# Patient Record
(demographics unavailable — no encounter records)

---

## 2025-07-28 NOTE — ASSESSMENT
[FreeTextEntry1] : Patient is a 59 y/o M who was recently admitted at [facility] (7/11 to 7/17/2025) for acute CVA. He presented to the clinic for transition of care and to establish care.  #CVA > MRI Head No Contrast 7.14.25 IMPRESSION: Small acute/subacute infarct in the left posterior corona radiata/basal ganglia. No acute intracranial hemorrhage. > Believed to be in setting of HTN. > Symptoms resolved during hospitalization.  #HLD > Lipid [id number]: Chol 307, , HDL 43, NonHDL 264, . > ASCVD (Atherosclerotic Cardiovascular Disease) Risk Algorithm including Known ASCVD from AHA/ACC from [employer]  on 7/28/2025:  High-intensity statin recommended; if not tolerated or not a candidate, moderate-intensity recommended because of known ASCVD and age 75. - START atorvastatin 80mg qHS  #Hashimoto Thyroiditis - TSH markedly elevated 224 repeat 201 , free T4 <0.1, TPO 94.9 - recheck TSH w/ FT4 - Continue Levothyroxine 100mg qd - f/u Endo  #HTN > Pt experiencing dizziness ~1h following admin of amlodipine. > BP elevated today, however pt endorses healthcare associated HTN. Noted to be normotensive inpatient at night. - BP Monitor at home w/ log - Discontinue amlodipine - Start Losartan 25mg qd  #Transaminitis Noted inpatient > LFTs on discharge 07.17.25: Protein Total: 7.5 g/dL, Albumin: 4.4 g/dL, Bilirubin Total: 0.6 mg/dL, Alkaline Phosphatase: 119 U/L, Aspartate Aminotransferase (AST/SGOT): 113 U/L, Alanine Aminotransferase (ALT/SGPT): 135 U/L DA. - Repeat LFTs  #PreDiabetes > A1C 6.3 on 7.11.25  #C/F kidney disease Cr on discharge 2.02 (7/17/25). - Repeat CMP, UA, Lytes, Prot/Cr today.  #Generalized Anxiety Disorder - NICKY-7 - 4 (minimal) Patient endorses feelings of anxiety and nervousness over past few months (prior to DC from hospital). He does feel worn out at times related to these feelings, in addition to restlessness and muscle tension. He does endorse sleep disturbance as well as difficult concentrating at times. These feelings have improved in recent weeks relative to prior hospitalization.  - Consider further evaluation at next encounter.  #ED #PSA Screening Pt endorses Nocturnal penile tumescence, however decrease in libido during sexual activity. I believe this is related to NICKY, and patient concurs. - Testosterone serology - Prostate serology - Further evaluation at next appointment.  Total time spent caring for the patient today was 60 minutes. This includes time spent before the visit reviewing the chart, time spent during visit, and time spent after the visit on documentation, etc.

## 2025-07-28 NOTE — INTERPRETER SERVICES
[Language Line ] : provided by [employer]   [Time Spent: ____ minutes] : Total time spent using  services: [unfilled] minutes. The patient's primary language is not English thus required  services. [TWNoteComboBox1] : Marshallese

## 2025-07-28 NOTE — ASSESSMENT
[FreeTextEntry1] : Patient is a 59 y/o M who was recently admitted at [facility] (7/11 to 7/17/2025) for acute CVA. He presented to the clinic for transition of care and to establish care.  #CVA > MRI Head No Contrast 7.14.25 IMPRESSION: Small acute/subacute infarct in the left posterior corona radiata/basal ganglia. No acute intracranial hemorrhage. > Believed to be in setting of HTN. > Symptoms resolved during hospitalization.  #HLD > Lipid [id number]: Chol 307, , HDL 43, NonHDL 264, . > ASCVD (Atherosclerotic Cardiovascular Disease) Risk Algorithm including Known ASCVD from AHA/ACC from [employer]  on 7/28/2025:  High-intensity statin recommended; if not tolerated or not a candidate, moderate-intensity recommended because of known ASCVD and age 75. - START atorvastatin 80mg qHS  #Hashimoto Thyroiditis - TSH markedly elevated 224 repeat 201 , free T4 <0.1, TPO 94.9 - recheck TSH w/ FT4 - Continue Levothyroxine 100mg qd - f/u Endo  #HTN > Pt experiencing dizziness ~1h following admin of amlodipine. > BP elevated today, however pt endorses healthcare associated HTN. Noted to be normotensive inpatient at night. - BP Monitor at home w/ log - Discontinue amlodipine - Start Losartan 25mg qd  #Transaminitis Noted inpatient > LFTs on discharge 07.17.25: Protein Total: 7.5 g/dL, Albumin: 4.4 g/dL, Bilirubin Total: 0.6 mg/dL, Alkaline Phosphatase: 119 U/L, Aspartate Aminotransferase (AST/SGOT): 113 U/L, Alanine Aminotransferase (ALT/SGPT): 135 U/L DA. - Repeat LFTs  #PreDiabetes > A1C 6.3 on 7.11.25  #C/F kidney disease Cr on discharge 2.02 (7/17/25). - Repeat CMP, UA, Lytes, Prot/Cr today.  #Generalized Anxiety Disorder - NICKY-7 - 4 (minimal) Patient endorses feelings of anxiety and nervousness over past few months (prior to DC from hospital). He does feel worn out at times related to these feelings, in addition to restlessness and muscle tension. He does endorse sleep disturbance as well as difficult concentrating at times. These feelings have improved in recent weeks relative to prior hospitalization.  - Consider further evaluation at next encounter.  #ED #PSA Screening Pt endorses Nocturnal penile tumescence, however decrease in libido during sexual activity. I believe this is related to NICKY, and patient concurs. - Testosterone serology - Prostate serology - Further evaluation at next appointment.  Total time spent caring for the patient today was 60 minutes. This includes time spent before the visit reviewing the chart, time spent during visit, and time spent after the visit on documentation, etc.

## 2025-07-28 NOTE — HISTORY OF PRESENT ILLNESS
[Post-hospitalization from ___ Hospital] : Post-hospitalization from [unfilled] Hospital [Admitted on: ___] : The patient was admitted on [unfilled] [Discharged on ___] : discharged on [unfilled] [Discharge Summary] : discharge summary [Pertinent Labs] : pertinent labs [Radiology Findings] : radiology findings [Discharge Med List] : discharge medication list [Med Reconciliation] : medication reconciliation has been completed [FreeTextEntry2] : Pt name in [facility] chart: Afua Christian; MRN [id number].  58M from home, ambulates independently at baseline, with no known PMH (has not seen a physician in years), who presented to the ED for right arm and leg weakness and numbness that started at 4:30AM today causing him to fall when getting out of bed, also with slurred speech over the past few days. 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   Admitted for CVA work up.  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 No acute intracranial hemorrhage. Secondary stroke prevention: ASA 81mg Plavix x 3 weeks. Until 8/1/2025.  TTE with LVEF 67%, negative for intracardiac shunt. - TTE  : Preserved EF, no PFO, will need internal loop recorder inpt Vs outpt. -Evaluated by Endo Found to have TSH markedly elevated 224 repeat 201 , free T4 <0.1, TPO 94.9. HX of  Hypothyroidism and Hashimoto's thyroiditis on synthroid 100mcg qd. currently not in Myxedema. A1c 6.3 Needs to repeat TFT in 4 weeks outpt, with PCP -Pt labs indicated Transaminitis >>144>149 and ALT 94>>120>139. and JAYDON  SCr 2.02>>1.99>2.14. Holding statin for now will need PCP f/u outpt.

## 2025-07-28 NOTE — HEALTH RISK ASSESSMENT
[Yes] : Yes [2 - 4 times a month (2 pts)] : 2-4 times a month (2 points) [1 or 2 (0 pts)] : 1 or 2 (0 points) [Never (0 pts)] : Never (0 points) [No] : In the past 12 months have you used drugs other than those required for medical reasons? No [No falls in past year] : Patient reported no falls in the past year [Little interest or pleasure doing things] : 1) Little interest or pleasure doing things [Feeling down, depressed, or hopeless] : 2) Feeling down, depressed, or hopeless [0] : 2) Feeling down, depressed, or hopeless: Not at all (0) [With Significant Other] : lives with significant other [Employed] : employed [Sexually Active] : sexually active [Fully functional (bathing, dressing, toileting, transferring, walking, feeding)] : Fully functional (bathing, dressing, toileting, transferring, walking, feeding) [Fully functional (using the telephone, shopping, preparing meals, housekeeping, doing laundry, using] : Fully functional and needs no help or supervision to perform IADLs (using the telephone, shopping, preparing meals, housekeeping, doing laundry, using transportation, managing medications and managing finances) [Never] : Never [de-identified] : Less than a drink a day. [Audit-CScore] : 2 [de-identified] : Danial [[id number]] : 0 [Reports changes in hearing] : Reports no changes in hearing [Reports changes in vision] : Reports no changes in vision [FreeTextEntry2] :

## 2025-07-28 NOTE — END OF VISIT
[] : Resident [FreeTextEntry3] : I, Dr. Dov Hendrickson, saw and evaluated this patient in the presence of the resident. I discussed the management with the resident. I reviewed the note and agreed with the documented plan of care with the following confirmation/ corrections/ additions: None.  [Time Spent: ___ minutes] : I have spent [unfilled] minutes of time on the encounter which excludes teaching and separately reported services.

## 2025-07-28 NOTE — HISTORY OF PRESENT ILLNESS
[Post-hospitalization from ___ Hospital] : Post-hospitalization from [unfilled] Hospital [Admitted on: ___] : The patient was admitted on [unfilled] [Discharged on ___] : discharged on [unfilled] [Discharge Summary] : discharge summary [Pertinent Labs] : pertinent labs [Radiology Findings] : radiology findings [Discharge Med List] : discharge medication list [Med Reconciliation] : medication reconciliation has been completed [FreeTextEntry2] : Pt name in [facility] chart: Afua Christian; MRN [id number].  58M from home, ambulates independently at baseline, with no known PMH (has not seen a physician in years), who presented to the ED for right arm and leg weakness and numbness that started at 4:30AM today causing him to fall when getting out of bed, also with slurred speech over the past few days. 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   Admitted for CVA work up.  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 No acute intracranial hemorrhage. Secondary stroke prevention: ASA 81mg Plavix x 3 weeks. Until 8/1/2025.  TTE with LVEF 67%, negative for intracardiac shunt. - TTE  : Preserved EF, no PFO, will need internal loop recorder inpt Vs outpt. -Evaluated by Endo Found to have TSH markedly elevated 224 repeat 201 , free T4 <0.1, TPO 94.9. HX of  Hypothyroidism and Hashimoto's thyroiditis on synthroid 100mcg qd. currently not in Myxedema. A1c 6.3 Needs to repeat TFT in 4 weeks outpt, with PCP -Pt labs indicated Transaminitis >>144>149 and ALT 94>>120>139. and JAYDON  SCr 2.02>>1.99>2.14. Holding statin for now will need PCP f/u outpt.

## 2025-07-28 NOTE — PHYSICAL EXAM
[Normal Sclera/Conjunctiva] : normal sclera/conjunctiva [PERRL] : pupils equal round and reactive to light [EOMI] : extraocular movements intact [Normal Outer Ear/Nose] : the outer ears and nose were normal in appearance [Normal Oropharynx] : the oropharynx was normal [No Respiratory Distress] : no respiratory distress  [No Accessory Muscle Use] : no accessory muscle use [Clear to Auscultation] : lungs were clear to auscultation bilaterally [Regular Rhythm] : with a regular rhythm [Normal S1, S2] : normal S1 and S2 [No Edema] : there was no peripheral edema [No Spinal Tenderness] : no spinal tenderness [No Joint Swelling] : no joint swelling [No Focal Deficits] : no focal deficits [Normal Gait] : normal gait [Cranial Nerves Optic (II)] : visual acuity and visual fields were intact [Cranial Nerves Oculomotor (III)] : the extraocular motions were intact [Cranial Nerves Trigeminal (V)] : sensation to the face and masseter strength were intact [Cranial Nerves Facial (VII)] : facial strength was intact bilaterally [Cranial Nerves Vestibulocochlear (VIII)] : hearing was intact [Cranial Nerves Accessory (XI - Cranial And Spinal)] : shoulder shrug was intact bilaterally [Cranial Nerves Hypoglossal (XII)] : there was no tongue deviation with protrusion [Sensation Tactile Decrease] : light touch was intact [Speech Grossly Normal] : speech grossly normal [Normal Affect] : the affect was normal [Alert and Oriented x3] : oriented to person, place, and time [Normal Mood] : the mood was normal [Normal Insight/Judgement] : insight and judgment were intact [No Acute Distress] : no acute distress [Well-Appearing] : well-appearing [Well Developed] : well developed [Well Nourished] : well nourished [No JVD] : no jugular venous distention [No Lymphadenopathy] : no lymphadenopathy [Supple] : supple [Thyroid Normal, No Nodules] : the thyroid was normal and there were no nodules present [Normal Appearance] : was normal in appearance [Neck Supple] : was supple [Enlarged Diffusely] : was not enlarged [Rate ___] : at [unfilled] breaths per minute [Normal Rhythm/Effort] : normal respiratory rhythm and effort [Clear Bilaterally] : the lungs were clear to auscultation bilaterally [Normal to Percussion] : the lungs were normal to percussion [No Carotid Bruits] : no carotid bruits [No Abdominal Bruit] : a ~M bruit was not heard ~T in the abdomen [No Varicosities] : no varicosities [Pedal Pulses Present] : the pedal pulses are present [No Palpable Aorta] : no palpable aorta [No Extremity Clubbing/Cyanosis] : no extremity clubbing/cyanosis [Normal Rate] : normal [Heart Rate ___] : [unfilled] bpm [Normal S1] : normal S1 [Normal S2] : normal S2 [S3] : no S3 [S4] : no S4 [No Murmur] : no murmurs heard [No Pitting Edema] : no pitting edema present [Right Carotid Bruit] : no bruit heard over the right carotid [Left Carotid Bruit] : no bruit heard over the left carotid [Right Femoral Bruit] : no bruit heard over the right femoral artery [Left Femoral Bruit] : no bruit heard over the left femoral artery [2+] : left 2+ [No Abnormalities] : the abdominal aorta was not enlarged and no bruit was heard [Soft] : abdomen soft [Non Tender] : non-tender [Non-distended] : non-distended [No HSM] : no HSM [Normal Bowel Sounds] : normal bowel sounds [Normal Posterior Cervical Nodes] : no posterior cervical lymphadenopathy [Normal Anterior Cervical Nodes] : no anterior cervical lymphadenopathy [Cervical Lymph Nodes Enlarged Posterior Bilaterally] : nodes not enlarged [Supraclavicular Lymph Nodes Enlarged Bilaterally] : nodes not enlarged [Axillary Lymph Nodes Enlarged Bilaterally] : nodes not enlarged [Inguinal Lymph Nodes Enlarged Bilaterally] : nodes not enlarged [No CVA Tenderness] : no CVA  tenderness [Kyphosis] : no kyphosis [Scoliosis] : no scoliosis [Normal Kyphosis] : normal kyphosis [No Visual Abnormalities] : no visible abnormalities [Normal Lordosis] : normal lordosis [No Scoliosis] : no scoliosis [No Tenderness to Palpation] : no spine tenderness on palpation [No Masses] : no masses [Full ROM] : full ROM [No Pain with ROM] : no pain with motion in any direction [Intact] : all reflexes within normal limits bilaterally [Grossly Normal Strength/Tone] : grossly normal strength/tone [Normal Station and Gait] : the gait and station were normal [Normal Motor Tone] : the muscle tone was normal [Involuntary Movements] : no involuntary movements were seen [No Rash] : no rash [Abnormal Color] : normal color and pigmentation [Skin Lesions 1] : no skin lesions were observed [Skin Turgor Decreased] : normal skin turgor [Normal] : normal texture and mobility [Coordination Grossly Intact] : coordination grossly intact [Deep Tendon Reflexes (DTR)] : deep tendon reflexes were 2+ and symmetric [Normal Mental Status] : the patient's orientation, memory, attention, language and fund of knowledge were normal [Appropriate] : appropriate [Impaired judgment] : intact judgment [Impaired Insight] : intact insight [de-identified] : normal tongue, good dentition

## 2025-07-28 NOTE — HEALTH RISK ASSESSMENT
[Yes] : Yes [2 - 4 times a month (2 pts)] : 2-4 times a month (2 points) [1 or 2 (0 pts)] : 1 or 2 (0 points) [Never (0 pts)] : Never (0 points) [No] : In the past 12 months have you used drugs other than those required for medical reasons? No [No falls in past year] : Patient reported no falls in the past year [Little interest or pleasure doing things] : 1) Little interest or pleasure doing things [Feeling down, depressed, or hopeless] : 2) Feeling down, depressed, or hopeless [0] : 2) Feeling down, depressed, or hopeless: Not at all (0) [With Significant Other] : lives with significant other [Employed] : employed [Sexually Active] : sexually active [Fully functional (bathing, dressing, toileting, transferring, walking, feeding)] : Fully functional (bathing, dressing, toileting, transferring, walking, feeding) [Fully functional (using the telephone, shopping, preparing meals, housekeeping, doing laundry, using] : Fully functional and needs no help or supervision to perform IADLs (using the telephone, shopping, preparing meals, housekeeping, doing laundry, using transportation, managing medications and managing finances) [Never] : Never [de-identified] : Less than a drink a day. [Audit-CScore] : 2 [de-identified] : Danial [[id number]] : 0 [Reports changes in hearing] : Reports no changes in hearing [Reports changes in vision] : Reports no changes in vision [FreeTextEntry2] :

## 2025-07-28 NOTE — INTERPRETER SERVICES
[Language Line ] : provided by [employer]   [Time Spent: ____ minutes] : Total time spent using  services: [unfilled] minutes. The patient's primary language is not English thus required  services. [TWNoteComboBox1] : Finnish

## 2025-07-28 NOTE — PHYSICAL EXAM
[Normal Sclera/Conjunctiva] : normal sclera/conjunctiva [PERRL] : pupils equal round and reactive to light [EOMI] : extraocular movements intact [Normal Outer Ear/Nose] : the outer ears and nose were normal in appearance [Normal Oropharynx] : the oropharynx was normal [No Respiratory Distress] : no respiratory distress  [No Accessory Muscle Use] : no accessory muscle use [Clear to Auscultation] : lungs were clear to auscultation bilaterally [Regular Rhythm] : with a regular rhythm [Normal S1, S2] : normal S1 and S2 [No Edema] : there was no peripheral edema [No Spinal Tenderness] : no spinal tenderness [No Joint Swelling] : no joint swelling [No Focal Deficits] : no focal deficits [Normal Gait] : normal gait [Cranial Nerves Optic (II)] : visual acuity and visual fields were intact [Cranial Nerves Oculomotor (III)] : the extraocular motions were intact [Cranial Nerves Trigeminal (V)] : sensation to the face and masseter strength were intact [Cranial Nerves Facial (VII)] : facial strength was intact bilaterally [Cranial Nerves Vestibulocochlear (VIII)] : hearing was intact [Cranial Nerves Accessory (XI - Cranial And Spinal)] : shoulder shrug was intact bilaterally [Cranial Nerves Hypoglossal (XII)] : there was no tongue deviation with protrusion [Sensation Tactile Decrease] : light touch was intact [Speech Grossly Normal] : speech grossly normal [Normal Affect] : the affect was normal [Alert and Oriented x3] : oriented to person, place, and time [Normal Mood] : the mood was normal [Normal Insight/Judgement] : insight and judgment were intact [No Acute Distress] : no acute distress [Well-Appearing] : well-appearing [Well Developed] : well developed [Well Nourished] : well nourished [No JVD] : no jugular venous distention [No Lymphadenopathy] : no lymphadenopathy [Supple] : supple [Thyroid Normal, No Nodules] : the thyroid was normal and there were no nodules present [Normal Appearance] : was normal in appearance [Neck Supple] : was supple [Enlarged Diffusely] : was not enlarged [Rate ___] : at [unfilled] breaths per minute [Normal Rhythm/Effort] : normal respiratory rhythm and effort [Clear Bilaterally] : the lungs were clear to auscultation bilaterally [Normal to Percussion] : the lungs were normal to percussion [No Carotid Bruits] : no carotid bruits [No Abdominal Bruit] : a ~M bruit was not heard ~T in the abdomen [No Varicosities] : no varicosities [Pedal Pulses Present] : the pedal pulses are present [No Palpable Aorta] : no palpable aorta [No Extremity Clubbing/Cyanosis] : no extremity clubbing/cyanosis [Normal Rate] : normal [Heart Rate ___] : [unfilled] bpm [Normal S1] : normal S1 [Normal S2] : normal S2 [S3] : no S3 [S4] : no S4 [No Murmur] : no murmurs heard [No Pitting Edema] : no pitting edema present [Right Carotid Bruit] : no bruit heard over the right carotid [Left Carotid Bruit] : no bruit heard over the left carotid [Right Femoral Bruit] : no bruit heard over the right femoral artery [Left Femoral Bruit] : no bruit heard over the left femoral artery [2+] : left 2+ [No Abnormalities] : the abdominal aorta was not enlarged and no bruit was heard [Soft] : abdomen soft [Non Tender] : non-tender [Non-distended] : non-distended [No HSM] : no HSM [Normal Bowel Sounds] : normal bowel sounds [Normal Posterior Cervical Nodes] : no posterior cervical lymphadenopathy [Normal Anterior Cervical Nodes] : no anterior cervical lymphadenopathy [Cervical Lymph Nodes Enlarged Posterior Bilaterally] : nodes not enlarged [Supraclavicular Lymph Nodes Enlarged Bilaterally] : nodes not enlarged [Axillary Lymph Nodes Enlarged Bilaterally] : nodes not enlarged [Inguinal Lymph Nodes Enlarged Bilaterally] : nodes not enlarged [No CVA Tenderness] : no CVA  tenderness [Kyphosis] : no kyphosis [Scoliosis] : no scoliosis [Normal Kyphosis] : normal kyphosis [No Visual Abnormalities] : no visible abnormalities [Normal Lordosis] : normal lordosis [No Scoliosis] : no scoliosis [No Tenderness to Palpation] : no spine tenderness on palpation [No Masses] : no masses [Full ROM] : full ROM [No Pain with ROM] : no pain with motion in any direction [Intact] : all reflexes within normal limits bilaterally [Grossly Normal Strength/Tone] : grossly normal strength/tone [Normal Station and Gait] : the gait and station were normal [Normal Motor Tone] : the muscle tone was normal [Involuntary Movements] : no involuntary movements were seen [No Rash] : no rash [Abnormal Color] : normal color and pigmentation [Skin Lesions 1] : no skin lesions were observed [Skin Turgor Decreased] : normal skin turgor [Normal] : normal texture and mobility [Coordination Grossly Intact] : coordination grossly intact [Deep Tendon Reflexes (DTR)] : deep tendon reflexes were 2+ and symmetric [Normal Mental Status] : the patient's orientation, memory, attention, language and fund of knowledge were normal [Appropriate] : appropriate [Impaired judgment] : intact judgment [Impaired Insight] : intact insight [de-identified] : normal tongue, good dentition

## 2025-07-28 NOTE — PLAN
[FreeTextEntry1] : Please follow up with Dr. Hendrickson (PCP) in one month. Please make appointments with your endocrinologist, neurologist, and cardiologist as soon as you can.

## 2025-07-28 NOTE — REVIEW OF SYSTEMS
[Poor Libido] : poor libido [Insomnia] : insomnia [Anxiety] : anxiety [Negative] : Heme/Lymph [Patient Intake Form Reviewed] : Patient intake form was reviewed [Fever] : no fever [Chills] : no chills [Fatigue] : no fatigue [Discharge] : no discharge [Pain] : no pain [Vision Problems] : no vision problems [Earache] : no earache [Hearing Loss] : no hearing loss [Chest Pain] : no chest pain [Palpitations] : no palpitations [Shortness Of Breath] : no shortness of breath [Wheezing] : no wheezing [Cough] : no cough [Abdominal Pain] : no abdominal pain [Nausea] : no nausea [Constipation] : no constipation [Diarrhea] : no diarrhea [Vomiting] : no vomiting [Dysuria] : no dysuria [Incontinence] : no incontinence [Hematuria] : no hematuria [Frequency] : no frequency [Impotence] : no impotency [Joint Pain] : no joint pain [Joint Stiffness] : no joint stiffness [Muscle Pain] : no muscle pain [Muscle Weakness] : no muscle weakness [Itching] : no itching [Skin Rash] : no skin rash [Headache] : no headache [Dizziness] : no dizziness [Fainting] : no fainting [Confusion] : no confusion [Unsteady Walk] : no ataxia [Memory Loss] : no memory loss [Suicidal] : not suicidal [Depression] : no depression [Easy Bleeding] : no easy bleeding